# Patient Record
(demographics unavailable — no encounter records)

---

## 2025-03-20 NOTE — DISCUSSION/SUMMARY
[EKG obtained to assist in diagnosis and management of assessed problem(s)] : EKG obtained to assist in diagnosis and management of assessed problem(s) [FreeTextEntry1] : Mr Russell is planned for AF ablation, and was just DCCV'd to sinus. However, a RICKI demonstrated mod-severe MR. his LA is dilated. Seen and discussed with Dr Adams for further management of RICKI finding of MR. He will remain on metoproolol 50 for now as long as asymptomatic and no significant bradycardia, and will manage suspicion for severe MR with Dr Adams. I will gladly see him for further EP management of AF once evaluation of MR is completed. He will stay on eliquis 5 mg BID. We will hold off on plans for ablation at this time.

## 2025-03-20 NOTE — REASON FOR VISIT
[Arrhythmia/ECG Abnorrmalities] : arrhythmia/ECG abnormalities [FreeTextEntry1] : 64 yo M with AF referred by Dr Adams to discuss AF

## 2025-03-20 NOTE — HISTORY OF PRESENT ILLNESS
[FreeTextEntry1] : 63 year old gentleman with recently diagnosed rectal cancer, presenting for evaluation of persistent AF.  He as recently noted to have AF during evaluation for colonoscopy. He has remained in AF with elevated rates, and has been generally asymptomatic.  A 3 day monitor from 10/21- 10/24/24 revealed persistent AF with average  bpm (range  bpm). TTE in the setting of AF revealed normal LV function. Metoprolol was increased to 100mg bid due to rapid rates. He has not yet been started on anticoagulation.  He did ultimately undergo colonoscopy with removal of several polyps, with pathology positive for invasive moderately differentiated adenocarcinoma arising from tubular adenoma with high-grade dysplasia. He underwent staging workup with a CT scan of the chest abdomen pelvis which was negative for metastatic disease. He is planned for MRI and flex sigmoidoscopy for further evaluation, prior to consideration of possible surgery vs chemo/XRT.  Current meds include Toprol And Eliquis started by Dr. Adams 12/25. ECG at that time demonstrated AF. He is telling me he does not have palpitations, but has noticed some reduced energy and possibly some STEVENSON C2V=0 We discussed treatment options as per last visit note and he decided he wishes to pursue RICKI DCCV' followed by AF ablation. He presented to RICKI DCCV at [facility] 3/14, where mod-severe MR was discovered as below. He was then cardioverted succesfuly He has been feeling well since. no dizziness presyncope/syncope/lightheadedness. On the other hand- has not felt a clear difference in exercise tolerance. He meassured HRs 54-80. Was on Metoprolol 200 qD, now down to 50. vitals- 130/68 52 bpm h 6'4 w 238 ECG- sinus bradycardia 52 bpm  RICKI 3/14/25:  1. Left ventricular systolic function is normal.  2. Normal right ventricular systolic function.  3. Prolapse of the middle scallop (A2) of the anterior mitral leaflet.  4. Moderate to severe mitral regurgitation at a blood pressure of 90/60 mmHg. The mitral regurgitant jet is posteriorly directed. Mechanism of mitral regurgitation: The mechanism of mitral regurgitation is due to primary mitral valve disease. Normal pulmonary venous flow. By Volumetric analysis: RV 58ml/beat, RF 55%, EROA 0.39cm2.  5. Agitated saline injection was negative for intracardiac shunt.  6. Mild atheroma in the visualized portions of the proximal ascending aorta. Mild atheroma in the visualized portions of the transverse aortic arch. Moderate non-mobile diffuse atheroma which measures up to 39.00 mm in the visualized portions of the descending aorta.  7. No evidence of left atrial or left atrial appendage thrombus. The left atrial appendage emptying velocity is low at 32 cm/s.  8. Trileaflet aortic valve with normal systolic excursion.  9. Structurally normal tricuspid valve with normal leaflet excursion. Mild to moderate tricuspid regurgitation.